# Patient Record
(demographics unavailable — no encounter records)

---

## 2017-08-11 DIAGNOSIS — J45.30 MILD PERSISTENT ASTHMA WITHOUT COMPLICATION: ICD-10-CM

## 2017-08-11 RX ORDER — BUDESONIDE 0.5 MG/2ML
0.5 INHALANT ORAL 2 TIMES DAILY
Qty: 180 AMPULE | Refills: 6 | Status: CANCELLED | OUTPATIENT
Start: 2017-08-11

## 2017-08-11 RX ORDER — ALBUTEROL SULFATE 0.83 MG/ML
1 SOLUTION RESPIRATORY (INHALATION) EVERY 4 HOURS PRN
Qty: 1 BOX | Refills: 6 | Status: CANCELLED | OUTPATIENT
Start: 2017-08-11

## 2017-08-11 NOTE — TELEPHONE ENCOUNTER
Albuterol NEB         Last Written Prescription Date: 03/31/16   Last Fill Quantity: 1 box, # refills: 6    Last Office Visit with [facility] or  Health prescribing provider:  03/31/16   Future Office Visit:       Date of Last Asthma Action Plan Letter:   There are no preventive care reminders to display for this patient.   Asthma Control Test:   ACT Total Scores 5/6/2014   ACT TOTAL SCORE 25   ASTHMA ER VISITS 0 = None   ASTHMA HOSPITALIZATIONS 0 = None       Date of Last Spirometry Test:   No results found for this or any previous visit.    Pulmicort          Last Written Prescription Date: 03/31/16  Last Fill Quantity: 180, # refills: 6    Last Office Visit with [facility] [place] or  Health prescribing provider:  03/31/16   Future Office Visit:       Date of Last Asthma Action Plan Letter:   There are no preventive care reminders to display for this patient.   Asthma Control Test:   ACT Total Scores 5/6/2014   ACT TOTAL SCORE 25   ASTHMA ER VISITS 0 = None   ASTHMA HOSPITALIZATIONS 0 = None       Date of Last Spirometry Test:   No results found for this or any previous visit.    [facility] Pharmacy Technician  [facility]

## 2017-08-15 NOTE — TELEPHONE ENCOUNTER
Patient overdue for clinic appointment. Call placed to mom to discuss. Mom states that patient has medication to get by for now and she will call back to schedule appointment for [facility] and asthma check.     [facility] RN